# Patient Record
Sex: FEMALE | Race: WHITE | NOT HISPANIC OR LATINO | Employment: OTHER | URBAN - METROPOLITAN AREA
[De-identification: names, ages, dates, MRNs, and addresses within clinical notes are randomized per-mention and may not be internally consistent; named-entity substitution may affect disease eponyms.]

---

## 2019-12-10 ENCOUNTER — HOSPITAL ENCOUNTER (EMERGENCY)
Facility: HOSPITAL | Age: 74
Discharge: HOME | End: 2019-12-10
Attending: FAMILY MEDICINE
Payer: MEDICARE

## 2019-12-10 VITALS
HEART RATE: 74 BPM | DIASTOLIC BLOOD PRESSURE: 85 MMHG | SYSTOLIC BLOOD PRESSURE: 160 MMHG | BODY MASS INDEX: 24.48 KG/M2 | HEIGHT: 62 IN | TEMPERATURE: 99 F | OXYGEN SATURATION: 97 % | WEIGHT: 133 LBS | RESPIRATION RATE: 16 BRPM

## 2019-12-10 DIAGNOSIS — M25.511 RIGHT SHOULDER PAIN: ICD-10-CM

## 2019-12-10 DIAGNOSIS — R03.0 ELEVATED BLOOD PRESSURE READING: ICD-10-CM

## 2019-12-10 DIAGNOSIS — M25.521 RIGHT ELBOW PAIN: ICD-10-CM

## 2019-12-10 DIAGNOSIS — M79.601 RIGHT ARM PAIN: ICD-10-CM
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
## 2019-12-10 PROCEDURE — 99283 EMERGENCY DEPT VISIT LOW MDM: CPT | Mod: 25

## 2019-12-10 RX ORDER — CETIRIZINE HYDROCHLORIDE 10 MG/1
10 TABLET ORAL DAILY
COMMUNITY

## 2019-12-10 RX ORDER — GABAPENTIN 300 MG/1
CAPSULE ORAL
COMMUNITY
Start: 2017-03-02

## 2019-12-10 RX ORDER — MONTELUKAST SODIUM 10 MG/1
TABLET ORAL
COMMUNITY

## 2019-12-10 RX ORDER — FESOTERODINE FUMARATE 8 MG/1
TABLET, EXTENDED RELEASE ORAL
COMMUNITY

## 2019-12-10 RX ORDER — CELECOXIB 200 MG/1
CAPSULE ORAL
COMMUNITY

## 2019-12-10 NOTE — ED PROVIDER NOTES
History      Chief Complaint   Patient presents with    Fall     Pt c/o of slip and  fall xs 2 in the hospital today with c/o right thumb, wrist, shoulder, and hip pain.        Review of patient's allergies indicates:  No Known Allergies     HPI   HPI    12/10/2019, 11:49 AM   History obtained from the patient      History of Present Illness: Flory Mason is a 74 y.o. female patient who presents to the Emergency Department for fall x 2 this morning.  Patient states that shoes were wet and she slipped a fell.  Patient states that she fell again when her foot caught the swinging door.  Associated symptoms include right thumb pain, right wrist pain, right shoulder pain and right hip pain.  Denies LOC.  Denies fever, vomiting, diarrhea, chest pain, SOB, headache, dizziness.       Arrival mode: Personal vehicle      PCP: Tomer Corona MD       Past Medical History:  History reviewed. No pertinent past medical history.    Past Surgical History:  Past Surgical History:   Procedure Laterality Date    APPENDECTOMY      BACK SURGERY      CHOLECYSTECTOMY      HYSTERECTOMY      NECK SURGERY      TONSILLECTOMY           Family History:  History reviewed. No pertinent family history.    Social History:  Social History     Tobacco Use    Smoking status: Never Smoker    Smokeless tobacco: Never Used   Substance and Sexual Activity    Alcohol use: Not Currently    Drug use: Never    Sexual activity: Not Currently     Partners: Male       ROS   Review of Systems   Constitutional: Negative for chills and fever.   HENT: Negative for congestion, ear pain and rhinorrhea.    Eyes: Negative for discharge and redness.   Respiratory: Negative for cough and wheezing.    Cardiovascular: Negative for chest pain and palpitations.   Gastrointestinal: Negative for diarrhea, nausea and vomiting.   Genitourinary: Negative for dysuria and frequency.   Musculoskeletal: Positive for arthralgias and myalgias.   Skin: Negative for  rash and wound.   Neurological: Negative for dizziness and headaches.       Physical Exam      Initial Vitals [12/10/19 1136]   BP Pulse Resp Temp SpO2   (!) 160/85 74 16 98.8 °F (37.1 °C) 97 %      MAP       --          Physical Exam  Nursing Notes and Vital Signs Reviewed.  Constitutional: Patient is in no apparent distress. Awake and alert. Well-developed and well-nourished.  Head: Atraumatic. Normocephalic.  Eyes: PERRL. EOM intact. Conjunctivae are not pale. No scleral icterus.  ENT: Mucous membranes are moist. Oropharynx is clear and symmetric.    Neck: Supple. Full ROM. No lymphadenopathy.  Cardiovascular: Regular rate. Regular rhythm. No murmurs, rubs, or gallops. Distal pulses are 2+ and symmetric.  Pulmonary/Chest: No respiratory distress. Clear to auscultation bilaterally. No wheezing, rales, or rhonchi.  Abdominal: Soft and non-distended.  There is no tenderness.  No rebound, guarding, or rigidity. Musculoskeletal: Moves all extremities. No obvious deformities. No edema. No calf tenderness.  Patient able to ambulate.    RUE:  TTP over thumb.  TTP over wrist and forearm. TTP over anterior and posterior shoulder.  Pain with ROM of shoulder.  Pain with flexion and extension of wrist.  Bruising noted to forearm.  Pain with flexion and extension of elbow.  Radial pulse 2+.  Brisk capillary refill.  Able to touch each finger tip to thumb.    Right hip:  TTP over greater trochanteric bursa.  TTP over PSIS.  No tenderness over lumbar spine.  Pain with flexion and extension of hip.  No pain with lumbar flexion and extension.   Skin: Warm and dry.  Neurological:  Alert, awake, and appropriate.  Normal speech.  No acute focal neurological deficits are appreciated.  Psychiatric: Normal affect. Good eye contact. Appropriate in content.    ED Course    Orthopedic Injury  Date/Time: 12/10/2019 5:01 PM  Performed by: Lorraine Acevedo PA-C  Authorized by: Karyn Saez MD     Consent Done?:  Yes  Delta  "Protocol:     Verbal consent obtained?: Yes      Risks and benefits: Risks, benefits and alternatives were discussed      Consent given by:  Patient  Injury:     Injury location:  Elbow    Location details:  Right elbow    Injury type:  Soft tissue      Pre-procedure assessment:     Neurovascular status: Neurovascularly intact        Selections made in this section will also lock the Injury type section above.:     Immobilization:  Sling    Complications: No    Post-procedure assessment:     Neurovascular status: Neurovascularly intact      Patient tolerance:  Patient tolerated the procedure well with no immediate complications     Patient given ice pack      ED Vital Signs:  Vitals:    12/10/19 1136 12/10/19 1137   BP: (!) 160/85    Pulse: 74    Resp: 16    Temp: 98.8 °F (37.1 °C)    TempSrc: Oral    SpO2: 97%    Weight:  60.3 kg (133 lb)   Height: 5' 2" (1.575 m)        Abnormal Lab Results:  Labs Reviewed - No data to display     All Lab Results:  None    Imaging Results:  Imaging Results          X-Ray Hand 3 view Right (Final result)  Result time 12/10/19 12:18:24    Final result by Chun Neumann MD (12/10/19 12:18:24)                 Impression:      Degenerative changes of the STT joint.      Electronically signed by: Chun Neumann MD  Date:    12/10/2019  Time:    12:18             Narrative:    EXAMINATION:  XR HAND COMPLETE 3 VIEW RIGHT    CLINICAL HISTORY:  right thumb pain after fall;    TECHNIQUE:  PA, lateral, and oblique views of the right hand were performed.    COMPARISON:  None    FINDINGS:  Degenerative changes of the STT joint.  No fractures or dislocations.                               X-Ray Humerus 2 View Right (Final result)  Result time 12/10/19 12:17:35    Final result by Chun Neumann MD (12/10/19 12:17:35)                 Impression:      Degenerative changes of the glenohumeral joint and acromioclavicular joint.  No acute injury/fracture.      Electronically signed by: Chun Neumann, " MD  Date:    12/10/2019  Time:    12:17             Narrative:    EXAMINATION:  XR HUMERUS 2 VIEW RIGHT    CLINICAL HISTORY:  Pain in right arm    COMPARISON:  None    FINDINGS:  Degenerative changes of the acromioclavicular joint.  Degenerative changes of the glenohumeral joint.  No osseous abnormality of the femur.                               X-Ray Forearm Right (Final result)  Result time 12/10/19 12:16:54    Final result by Chun Neumann MD (12/10/19 12:16:54)                 Impression:      Normal right forearm.      Electronically signed by: Chun Neumann MD  Date:    12/10/2019  Time:    12:16             Narrative:    EXAMINATION:  XR FOREARM RIGHT    CLINICAL HISTORY:  Pain in right arm    TECHNIQUE:  AP and lateral views of the right forearm were performed.    COMPARISON:  None    FINDINGS:  No acute osseous abnormality.                               X-Ray Shoulder Complete 2 View Right (Final result)  Result time 12/10/19 12:16:20    Final result by Chun Neumann MD (12/10/19 12:16:20)                 Impression:      Mild degenerative changes.  No fracture dislocation.  Remote cervical fusion.      Electronically signed by: Chun Neumann MD  Date:    12/10/2019  Time:    12:16             Narrative:    EXAMINATION:  XR SHOULDER COMPLETE 2 OR MORE VIEWS RIGHT    CLINICAL HISTORY:  Pain in right shoulder    TECHNIQUE:  Two or three views of the right shoulder were performed.    COMPARISON:  None    FINDINGS:  Mild degenerative changes of the acromioclavicular joint.  The glenohumeral joint appears normal.  No evidence of fracture or dislocation.                                        The Emergency Provider reviewed the vital signs and test results, which are outlined above.    ED Discussion     12:45 PM:  Explained to patient that right hip xray had not been ordered.  Offered to order hip xray.  Patient states that right hip is no longer hurting and that she does not wish to have it xray.  Patient signed  refusal form for xray of hip. Patient states that if right hip pain returns she will follow-up with PCP for xray.      12:53 PM: Patient declines pain medication in ED. Reassessed pt at this time.  Pt states her condition has improved at this time. Discussed with pt all pertinent ED information and results. Discussed pt dx and plan of tx. Gave pt all f/u and return to the ED instructions. All questions and concerns were addressed at this time. Pt expresses understanding of information and instructions, and is comfortable with plan to discharge. Pt is stable for discharge.    Pre-hypertension/Hypertension: The pt has been informed that they may have pre-hypertension or hypertension based on a blood pressure reading in the ED. I recommend that the pt call the PCP listed on their discharge instructions or a physician of their choice this week to arrange f/u for further evaluation of possible pre-hypertension or hypertension.     Trauma precautions were discussed with patient and/or family/caretaker; I do not specifically detect any abdominal, thoracic, CNS, orthopedic, or other emergent or life threatening condition and that patient is safe to be discharged.  It was also discussed that despite an unrevealing examination and negative radiographic examination for serious or life threatening injury, these conditions may still exist.  As such, patient should return to ED immediately should they experience, severe or worsening pain, shortness of breath, abdominal pain, headache, vomiting, or any other concern.  It was also discussed that not infrequently, injuries may not be diagnosed during the initial ED visit (such as fractures) and that if the patient discovers a new area of concern, a new area of injury that was not evaluated in the ED, they should return for evaluation as they may have an injury that requires treatment.      ED Medication(s):  Medications - No data to display    Discharge Medication List as of  12/10/2019 12:53 PM          Follow-up Information     Tomer Corona MD In 3 days.    Specialty:  Family Medicine  Contact information:  [address]  [place] [id number] [phone]                     Medical Decision Making                  Clinical Impression       ICD-10-CM ICD-9-CM   1. Fall, initial encounter W19.XXXA E888.9   2. Right arm pain M79.601 729.5   3. Right shoulder pain M25.511 719.41   4. Right elbow pain M25.521 719.42   5. Elevated blood pressure reading R03.0 796.2       Disposition:   Disposition: Discharged  Condition: Stable           Lorraine Acevedo PA-C  12/10/19 1701